# Patient Record
Sex: FEMALE | Race: WHITE
[De-identification: names, ages, dates, MRNs, and addresses within clinical notes are randomized per-mention and may not be internally consistent; named-entity substitution may affect disease eponyms.]

---

## 2017-05-18 ENCOUNTER — HOSPITAL ENCOUNTER (OUTPATIENT)
Dept: HOSPITAL 62 - RAD | Age: 52
End: 2017-05-18
Attending: NURSE PRACTITIONER
Payer: COMMERCIAL

## 2017-05-18 DIAGNOSIS — E04.1: Primary | ICD-10-CM

## 2017-05-18 PROCEDURE — 76536 US EXAM OF HEAD AND NECK: CPT

## 2018-05-24 ENCOUNTER — HOSPITAL ENCOUNTER (OUTPATIENT)
Dept: HOSPITAL 62 - LAB | Age: 53
End: 2018-05-24
Attending: SPECIALIST
Payer: COMMERCIAL

## 2018-05-24 DIAGNOSIS — E66.09: Primary | ICD-10-CM

## 2018-05-24 LAB
ALBUMIN SERPL-MCNC: 3.8 G/DL (ref 3.5–5)
ALP SERPL-CCNC: 102 U/L (ref 38–126)
ALT SERPL-CCNC: 29 U/L (ref 9–52)
ANION GAP SERPL CALC-SCNC: 13 MMOL/L (ref 5–19)
AST SERPL-CCNC: 16 U/L (ref 14–36)
BILIRUB DIRECT SERPL-MCNC: 0.2 MG/DL (ref 0–0.4)
BILIRUB SERPL-MCNC: 0.4 MG/DL (ref 0.2–1.3)
BUN SERPL-MCNC: 11 MG/DL (ref 7–20)
CALCIUM: 9.3 MG/DL (ref 8.4–10.2)
CHLORIDE SERPL-SCNC: 109 MMOL/L (ref 98–107)
CHOLEST SERPL-MCNC: 202.01 MG/DL (ref 0–200)
CO2 SERPL-SCNC: 24 MMOL/L (ref 22–30)
GLUCOSE SERPL-MCNC: 123 MG/DL (ref 75–110)
LDLC SERPL DIRECT ASSAY-MCNC: 117 MG/DL (ref ?–100)
POTASSIUM SERPL-SCNC: 4.4 MMOL/L (ref 3.6–5)
PROT SERPL-MCNC: 6.4 G/DL (ref 6.3–8.2)
SODIUM SERPL-SCNC: 145.6 MMOL/L (ref 137–145)
TRIGL SERPL-MCNC: 227 MG/DL (ref ?–150)
VLDLC SERPL CALC-MCNC: 45.4 MG/DL (ref 10–31)

## 2018-05-24 PROCEDURE — 36415 COLL VENOUS BLD VENIPUNCTURE: CPT

## 2018-05-24 PROCEDURE — 84443 ASSAY THYROID STIM HORMONE: CPT

## 2018-05-24 PROCEDURE — 80061 LIPID PANEL: CPT

## 2018-05-24 PROCEDURE — 80053 COMPREHEN METABOLIC PANEL: CPT

## 2019-11-22 NOTE — DISCHARGE SUMMARY
Discharge Summary (SDC)





- Discharge


Final Diagnosis: 





Dysfunctional Mediport


Date of Surgery: 11/22/19


Discharge Date: 11/22/19


Condition: Stable


Treatment or Instructions: 


Discharge home.  Diet as tolerated.  Activity: Nonstrenuous.  Follow-up with me 

in 7 to 10 days.  Norco 5/325 mg p.o. every 6 hours as needed for pain.  Okay to

shower in 48 hours.  No tub baths or swimming pools x2 weeks.


Referrals: 


SOLEDAD HORTON NP [Primary Care Provider] - 


Discharge Diet: As Tolerated


Respiratory Treatments at Home: Deep Breathing/Coughing, Incentive Spirometer


Discharge Activity: Balance Activity w/Rest


Home Care Assistance: None Needed


Report the Following to Your Physician Immediately: Shortness of Breath, Nausea,

Vomiting, Increase in Pain, Fever over 101 Degrees, Unusual Bleeding

## 2019-11-22 NOTE — OPERATIVE REPORT
Nonrecallable Operative Report


DATE OF SURGERY: 11/22/19


PREOPERATIVE DIAGNOSIS: Dysfunctional Mediport


POSTOPERATIVE DIAGNOSIS: Same as above


OPERATION: Excision of right chest wall Mediport


SURGEON: SHONNA LLANOS


1ST ASSISTANT: LENA CEDILLO


ANESTHESIA: LMAC


TISSUE REMOVED OR ALTERED: Right chest wall Mediport


COMPLICATIONS: 





None apparent


ESTIMATED BLOOD LOSS: Minimal


PROCEDURE: 





Drains/implants: None.





Procedure in detail: After informed consent was obtained, she was brought into 

the operating room and laid in the supine position.  The area of the right chest

was prepped and draped in a normal sterile fashion.  Lidocaine mixed with 

bupivacaine was infiltrated into the skin of the right chest.  An incision was 

created within the bounds of a previous scar.  Using sharp and blunt dissection,

the Mediport hub was freed from the capsule.  The catheter was then removed from

the vein.  Pressure was held and hemostasis was achieved.  The Mediport hub was 

then removed from the subcutaneous tissue.  The capsule was excised.  The 

subcutaneous tissues were closed using 3-0 Vicryl suture.  The skin was closed 

using 4-0 Vicryl Rapide suture in subcuticular fashion.  A dressing was placed, 

and the procedure was concluded.  All sponge, instrument, needle counts were 

correct x2.





Condition: Stable.





Lena Cedillo PA-C was scrubbed and present the entirety the procedure.  She 

assisted with all portions of procedure including opening of the skin, 

dissection of the Mediport, removal of the Mediport, closure and subcutaneous 

tissue, and closure of the skin.

## 2019-11-22 NOTE — EKG REPORT
SEVERITY:- OTHERWISE NORMAL ECG -

SINUS RHYTHM

VENTRICULAR PREMATURE COMPLEX

:

Confirmed by: Andre Connor MD 22-Nov-2019 15:05:17

## 2019-11-22 NOTE — RADIOLOGY REPORT (SQ)
EXAM DESCRIPTION:  CHEST SINGLE VIEW



COMPLETED DATE/TIME:  11/22/2019 7:40 am



REASON FOR STUDY:  PREOP



COMPARISON:  AP view of the chest from 4/19/2013



EXAM PARAMETERS:  NUMBER OF VIEWS: One view.

TECHNIQUE: Single frontal radiographic view of the chest acquired.

RADIATION DOSE: NA

LIMITATIONS: None.



FINDINGS:  LUNGS AND PLEURA: No consolidation, pleural effusion or pneumothorax.

MEDIASTINUM AND HILAR STRUCTURES: No mediastinal or hilar contour abnormality.

HEART AND VASCULAR STRUCTURES: The cardiac silhouette and pulmonary vasculature are within normal brooks
its.

BONES: No acute findings.

HARDWARE: The tip of the right subclavian vein approach single-lumen port projects within the SVC.

OTHER: No other finding.



IMPRESSION:  No acute cardiopulmonary process.



TECHNICAL DOCUMENTATION:  JOB ID:  [id number]

 2011 [employer]- All Rights Reserved



Reading location - IP/workstation name: MARIA VICTORIA